# Patient Record
Sex: MALE | URBAN - METROPOLITAN AREA
[De-identification: names, ages, dates, MRNs, and addresses within clinical notes are randomized per-mention and may not be internally consistent; named-entity substitution may affect disease eponyms.]

---

## 2023-10-24 ENCOUNTER — OFFICE VISIT (OUTPATIENT)
Dept: URBAN - METROPOLITAN AREA CLINIC 113 | Facility: CLINIC | Age: 43
End: 2023-10-24
Payer: COMMERCIAL

## 2023-10-24 VITALS
DIASTOLIC BLOOD PRESSURE: 78 MMHG | HEIGHT: 69 IN | RESPIRATION RATE: 18 BRPM | BODY MASS INDEX: 29.92 KG/M2 | SYSTOLIC BLOOD PRESSURE: 128 MMHG | HEART RATE: 90 BPM | WEIGHT: 202 LBS | TEMPERATURE: 97.8 F

## 2023-10-24 DIAGNOSIS — K26.9 DUODENAL ULCER: ICD-10-CM

## 2023-10-24 DIAGNOSIS — D62 ANEMIA DUE TO BLOOD LOSS, ACUTE: ICD-10-CM

## 2023-10-24 DIAGNOSIS — T39.395A ADVERSE EFFECT OF OTHER NONSTEROIDAL ANTI-INFLAMMATORY DRUGS [NSAID], INITIAL ENCOUNTER: ICD-10-CM

## 2023-10-24 DIAGNOSIS — K92.2 GASTROINTESTINAL HEMORRHAGE, UNSPECIFIED: ICD-10-CM

## 2023-10-24 PROBLEM — 51868009: Status: ACTIVE | Noted: 2023-10-24

## 2023-10-24 PROBLEM — 419901001: Status: ACTIVE | Noted: 2023-10-24

## 2023-10-24 PROBLEM — 52305004: Status: ACTIVE | Noted: 2023-10-24

## 2023-10-24 PROBLEM — 267530009: Status: ACTIVE | Noted: 2023-10-24

## 2023-10-24 PROCEDURE — 99204 OFFICE O/P NEW MOD 45 MIN: CPT | Performed by: INTERNAL MEDICINE

## 2023-10-24 PROCEDURE — 91065 BREATH HYDROGEN/METHANE TEST: CPT | Performed by: INTERNAL MEDICINE

## 2023-10-24 RX ORDER — PANTOPRAZOLE SODIUM 20 MG/1
1 TABLET TABLET, DELAYED RELEASE ORAL ONCE A DAY
Qty: 90 | Refills: 5

## 2023-10-24 RX ORDER — PANTOPRAZOLE SODIUM 20 MG/1
1 TABLET TABLET, DELAYED RELEASE ORAL ONCE A DAY
Status: ACTIVE | COMMUNITY

## 2023-10-24 RX ORDER — DEXTROAMPHETAMINE SACCHARATE, AMPHETAMINE ASPARTATE, DEXTROAMPHETAMINE SULFATE, AND AMPHETAMINE SULFATE 7.5; 7.5; 7.5; 7.5 MG/1; MG/1; MG/1; MG/1
1 TABLET TABLET ORAL TWICE A DAY
Status: ACTIVE | COMMUNITY

## 2023-10-24 RX ORDER — ALPRAZOLAM 1 MG/1
1 TABLET TABLET ORAL TWICE A DAY
Status: ACTIVE | COMMUNITY

## 2023-10-24 NOTE — HPI-TODAY'S VISIT:
Ms. George is a 42-year-old male referred from [facility] for evaluation of recent upper GI bleed.  He was hospitalized in July in [place] when he was traveling there for work.  He works as a diesel .  He was having increasing weakness and fatigue.  He was having dark-colored red stools.  He had mild abdominal discomfort and decreased appetite.  He was using Goody's and ibuprofen daily for many years.  He was seen at a [place] hospital, [facility].  His hemoglobin was 4 on admission.  He received multiple units of blood and thinks he had around 14 to 16 units.  He underwent EGD x2.  The initial EGD revealed a large ulcer requiring a bear claw clip placement.  Repeat EGD a few days later revealed no further bleeding.  He was placed on Protonix twice daily for 2 months and has reduced to once daily now.  If he misses his dose he will have increased reflux symptoms.  He was also given vitamin B12 in the hospital.  He has discontinued all NSAIDs, but occasionally will use an NSAID for migraine headaches.  No prior colonoscopies.  He is unsure if he was tested for H. pylori.  No family history of colon cancer, inflammatory bowel disease GI cancers, peptic ulcer disease or chronic liver disease. No significant alcohol use and only drinks occasionally, no smoking or illicit drug use.  No colonoscopy.  Labs 9/11/2023 WBC 6.1, hemoglobin 11.6, MCV 78, platelets 320; BUN 20, creatinine 0.94, AST 14, ALT 23, alk phos 83, T. bili 0.2, albumin 4.8, hemoglobin A1c 5.7, HIV nonreactive, TSH 1.29;Hepatitis A total antibody positive, hepatitis A IgM negative, hepatitis B surface antigen negative, hepatitis B surface antibody reactive, hepatitis B core total antibody negative, hepatitis C antibody nonreactive

## 2023-10-30 LAB — H PYLORI BREATH TEST: NOT DETECTED

## 2024-01-03 ENCOUNTER — DASHBOARD ENCOUNTERS (OUTPATIENT)
Age: 44
End: 2024-01-03

## 2024-01-04 ENCOUNTER — OFFICE VISIT (OUTPATIENT)
Dept: URBAN - METROPOLITAN AREA CLINIC 113 | Facility: CLINIC | Age: 44
End: 2024-01-04

## 2024-01-04 RX ORDER — ALPRAZOLAM 1 MG/1
1 TABLET TABLET ORAL TWICE A DAY
Status: ACTIVE | COMMUNITY

## 2024-01-04 RX ORDER — DEXTROAMPHETAMINE SACCHARATE, AMPHETAMINE ASPARTATE, DEXTROAMPHETAMINE SULFATE, AND AMPHETAMINE SULFATE 7.5; 7.5; 7.5; 7.5 MG/1; MG/1; MG/1; MG/1
1 TABLET TABLET ORAL TWICE A DAY
Status: ACTIVE | COMMUNITY

## 2024-01-04 RX ORDER — PANTOPRAZOLE SODIUM 20 MG/1
1 TABLET TABLET, DELAYED RELEASE ORAL ONCE A DAY
Qty: 90 | Refills: 5 | Status: ACTIVE | COMMUNITY